# Patient Record
Sex: FEMALE | Race: BLACK OR AFRICAN AMERICAN | Employment: OTHER | URBAN - METROPOLITAN AREA
[De-identification: names, ages, dates, MRNs, and addresses within clinical notes are randomized per-mention and may not be internally consistent; named-entity substitution may affect disease eponyms.]

---

## 2018-06-21 ENCOUNTER — HOSPITAL ENCOUNTER (EMERGENCY)
Age: 48
Discharge: HOME | End: 2018-06-21
Attending: EMERGENCY MEDICINE
Payer: OTHER GOVERNMENT

## 2018-06-21 VITALS
RESPIRATION RATE: 18 BRPM | HEIGHT: 66 IN | BODY MASS INDEX: 26.84 KG/M2 | TEMPERATURE: 97.9 F | SYSTOLIC BLOOD PRESSURE: 128 MMHG | HEART RATE: 80 BPM | DIASTOLIC BLOOD PRESSURE: 87 MMHG | WEIGHT: 167 LBS | OXYGEN SATURATION: 100 %

## 2018-06-21 DIAGNOSIS — S39.012A STRAIN OF LUMBAR REGION, INITIAL ENCOUNTER: ICD-10-CM
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
## 2018-06-21 DIAGNOSIS — S16.1XXA STRAIN OF NECK MUSCLE, INITIAL ENCOUNTER: ICD-10-CM

## 2018-06-21 PROCEDURE — 99282 EMERGENCY DEPT VISIT SF MDM: CPT

## 2018-06-21 RX ORDER — CYCLOBENZAPRINE HCL 5 MG
5 TABLET ORAL
Qty: 10 TAB | Refills: 0 | Status: SHIPPED | OUTPATIENT
Start: 2018-06-21

## 2018-06-21 RX ORDER — IBUPROFEN 800 MG/1
800 TABLET ORAL
Qty: 20 TAB | Refills: 0 | Status: SHIPPED | OUTPATIENT
Start: 2018-06-21 | End: 2018-06-28

## 2018-06-21 NOTE — LETTER
[facility] [place] 
[facility] 
[address] [address] 
[phone] Work/School Note Date: 6/21/2018 To Whom It May concern: 
 
Wilmer Dominique was seen and treated today in the emergency room by the following provider(s): 
Attending Provider: Bess Quick MD 
Physician Assistant: SCOOTER Santamaria. Wilmer Dominique may return to work on 6/23/18. Sincerely, Janet Iyer PA-C
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
## 2019-05-03 ENCOUNTER — HOSPITAL ENCOUNTER (EMERGENCY)
Age: 49
Discharge: ANOTHER HOSPITAL | End: 2019-05-04
Attending: EMERGENCY MEDICINE
Payer: OTHER GOVERNMENT

## 2019-05-03 ENCOUNTER — APPOINTMENT (OUTPATIENT)
Dept: CT IMAGING | Age: 49
End: 2019-05-03
Attending: EMERGENCY MEDICINE
Payer: OTHER GOVERNMENT

## 2019-05-03 DIAGNOSIS — R06.02 SOB (SHORTNESS OF BREATH): Primary | ICD-10-CM

## 2019-05-03 DIAGNOSIS — R00.0 TACHYCARDIA: ICD-10-CM

## 2019-05-03 DIAGNOSIS — F43.0 STRESS REACTION: ICD-10-CM

## 2019-05-03 DIAGNOSIS — O09.93 HIGH-RISK PREGNANCY IN THIRD TRIMESTER: ICD-10-CM

## 2019-05-03 DIAGNOSIS — R77.8 ELEVATED TROPONIN: ICD-10-CM

## 2019-05-03 LAB
ALBUMIN SERPL-MCNC: 2.4 G/DL (ref 3.4–5)
ALBUMIN/GLOB SERPL: 0.8 {RATIO} (ref 0.8–1.7)
ALP SERPL-CCNC: 114 U/L (ref 45–117)
ALT SERPL-CCNC: 36 U/L (ref 13–56)
ANION GAP SERPL CALC-SCNC: 9 MMOL/L (ref 3–18)
AST SERPL-CCNC: 35 U/L (ref 15–37)
BASOPHILS # BLD: 0 K/UL (ref 0–0.1)
BASOPHILS NFR BLD: 0 % (ref 0–2)
BILIRUB SERPL-MCNC: 0.4 MG/DL (ref 0.2–1)
BUN SERPL-MCNC: 10 MG/DL (ref 7–18)
BUN/CREAT SERPL: 13 (ref 12–20)
CALCIUM SERPL-MCNC: 8.3 MG/DL (ref 8.5–10.1)
CHLORIDE SERPL-SCNC: 108 MMOL/L (ref 100–108)
CK MB CFR SERPL CALC: 1.7 % (ref 0–4)
CK MB CFR SERPL CALC: ABNORMAL % (ref 0–4)
CK MB SERPL-MCNC: 1 NG/ML (ref 5–25)
CK MB SERPL-MCNC: <1 NG/ML (ref 5–25)
CK SERPL-CCNC: 58 U/L (ref 26–192)
CK SERPL-CCNC: 77 U/L (ref 26–192)
CO2 SERPL-SCNC: 20 MMOL/L (ref 21–32)
CREAT SERPL-MCNC: 0.76 MG/DL (ref 0.6–1.3)
DIFFERENTIAL METHOD BLD: ABNORMAL
EOSINOPHIL # BLD: 0.1 K/UL (ref 0–0.4)
EOSINOPHIL NFR BLD: 1 % (ref 0–5)
ERYTHROCYTE [DISTWIDTH] IN BLOOD BY AUTOMATED COUNT: 13.6 % (ref 11.6–14.5)
GLOBULIN SER CALC-MCNC: 3.2 G/DL (ref 2–4)
GLUCOSE SERPL-MCNC: 132 MG/DL (ref 74–99)
HCT VFR BLD AUTO: 37 % (ref 35–45)
HGB BLD-MCNC: 12.2 G/DL (ref 12–16)
LACTATE BLD-SCNC: 1.92 MMOL/L (ref 0.4–2)
LYMPHOCYTES # BLD: 1.2 K/UL (ref 0.9–3.6)
LYMPHOCYTES NFR BLD: 15 % (ref 21–52)
MCH RBC QN AUTO: 27.5 PG (ref 24–34)
MCHC RBC AUTO-ENTMCNC: 33 G/DL (ref 31–37)
MCV RBC AUTO: 83.5 FL (ref 74–97)
MONOCYTES # BLD: 0.6 K/UL (ref 0.05–1.2)
MONOCYTES NFR BLD: 7 % (ref 3–10)
NEUTS SEG # BLD: 6.1 K/UL (ref 1.8–8)
NEUTS SEG NFR BLD: 77 % (ref 40–73)
PLATELET # BLD AUTO: 137 K/UL (ref 135–420)
PMV BLD AUTO: 12.6 FL (ref 9.2–11.8)
POTASSIUM SERPL-SCNC: 3.9 MMOL/L (ref 3.5–5.5)
PROT SERPL-MCNC: 5.6 G/DL (ref 6.4–8.2)
RBC # BLD AUTO: 4.43 M/UL (ref 4.2–5.3)
SODIUM SERPL-SCNC: 137 MMOL/L (ref 136–145)
TROPONIN I SERPL-MCNC: 0.06 NG/ML (ref 0–0.04)
TROPONIN I SERPL-MCNC: 0.08 NG/ML (ref 0–0.04)
TROPONIN I SERPL-MCNC: 0.23 NG/ML (ref 0–0.04)
WBC # BLD AUTO: 8 K/UL (ref 4.6–13.2)

## 2019-05-03 PROCEDURE — 93005 ELECTROCARDIOGRAM TRACING: CPT

## 2019-05-03 PROCEDURE — 85025 COMPLETE CBC W/AUTO DIFF WBC: CPT

## 2019-05-03 PROCEDURE — 83605 ASSAY OF LACTIC ACID: CPT

## 2019-05-03 PROCEDURE — 74011250636 HC RX REV CODE- 250/636: Performed by: EMERGENCY MEDICINE

## 2019-05-03 PROCEDURE — 96360 HYDRATION IV INFUSION INIT: CPT

## 2019-05-03 PROCEDURE — 71275 CT ANGIOGRAPHY CHEST: CPT

## 2019-05-03 PROCEDURE — 82550 ASSAY OF CK (CPK): CPT

## 2019-05-03 PROCEDURE — 99285 EMERGENCY DEPT VISIT HI MDM: CPT

## 2019-05-03 PROCEDURE — 80053 COMPREHEN METABOLIC PANEL: CPT

## 2019-05-03 PROCEDURE — 74011636320 HC RX REV CODE- 636/320: Performed by: EMERGENCY MEDICINE

## 2019-05-03 RX ADMIN — IOPAMIDOL 100 ML: 755 INJECTION, SOLUTION INTRAVENOUS at 12:39

## 2019-05-03 RX ADMIN — SODIUM CHLORIDE 1000 ML: 900 INJECTION, SOLUTION INTRAVENOUS at 10:18

## 2019-05-03 NOTE — ED PROVIDER NOTES
EMERGENCY DEPARTMENT HISTORY AND PHYSICAL EXAM 
 
Date: 5/3/2019 Patient Name: Tish Carrera History of Presenting Illness Chief Complaint Patient presents with  Rapid Heart Rate History Provided By: Patient Chief Complaint: Palpitations Duration:  Minutes prior to arrival to ED Additional History (Context):  
 
Tish Carrera is a 51 y.o. female with PMHX of no medical problems per patient's report who presents to the emergency department C/O palpitations while she was driving. . Associated sxs include anxiety, shortness of breath, chest tightness. Pt denies chest pain, leg swelling or leg pain, abdominal pain, nausea, dizziness, headache, numbness, and any other sxs or complaints. She also denies any history of anxiety. Does states that she is under some stress. Patient states that she is 28 weeks pregnant with triplets and that she sees maternal fetal medicine at [facility].  Denies any recent or history of blood clots. States that the symptoms happened suddenly but now they are resolving. Is I am in the room evaluating her she states that the breathing is improved, and the palpitations are resolving. No other complaints. PCP: Mynor, MD [place] 
 
Current Outpatient Medications Medication Sig Dispense Refill  PNV No12-Iron-[id number]-DSS-OM-3 29 mg iron-1 mg -50 mg CPKD Take  by mouth.  cyclobenzaprine (FLEXERIL) 5 mg tablet Take 1 Tab by mouth three (3) times daily as needed for Muscle Spasm(s). 10 Tab 0 Past History Past Medical History: 
History reviewed. No pertinent past medical history. Past Surgical History: 
History reviewed. No pertinent surgical history. Family History: 
History reviewed. No pertinent family history. Social History: 
Social History Tobacco Use  Smoking status: Never Smoker  Smokeless tobacco: Never Used Substance Use Topics  Alcohol use: Not on file  Drug use: Not on file Allergies: No Known Allergies Review of Systems Review of Systems Constitutional: Negative for chills and fever. HENT: Negative for congestion, rhinorrhea, sore throat and trouble swallowing. Eyes: Negative for visual disturbance. Respiratory: Positive for chest tightness and shortness of breath. Negative for cough and wheezing. Cardiovascular: Positive for palpitations. Negative for chest pain. Gastrointestinal: Negative for abdominal pain, nausea and vomiting. Endocrine: Negative for polyuria. Genitourinary: Negative for dysuria. Musculoskeletal: Negative for arthralgias and neck stiffness. Skin: Negative for pallor and rash. Neurological: Negative for dizziness, weakness, numbness and headaches. Hematological: Does not bruise/bleed easily. Psychiatric/Behavioral: Negative for confusion and dysphoric mood. The patient is nervous/anxious. All other systems reviewed and are negative. Physical Exam  
 
Vitals:  
 05/03/19 1919 05/03/19 1920 05/03/19 1921 05/03/19 1922 BP:      
Pulse: 92 91 92 96 Resp: 19 18 21 20 Temp:      
SpO2:      
Weight:      
Height:      
 
Physical Exam  
Constitutional: She is oriented to person, place, and time. She appears well-developed and well-nourished. No distress. Hyperventilating HENT:  
Head: Normocephalic and atraumatic. Mouth/Throat: Oropharynx is clear and moist.  
Eyes: Pupils are equal, round, and reactive to light. Conjunctivae are normal. No scleral icterus. Neck: Normal range of motion. Neck supple. Cardiovascular: Normal rate and intact distal pulses. Exam reveals no gallop and no friction rub. No murmur heard. Capillary refill < 3 seconds Tachycardic Pulmonary/Chest: Effort normal and breath sounds normal. No respiratory distress. She has no wheezes. Abdominal: Soft. Bowel sounds are normal. She exhibits no distension. There is no tenderness. Gravid abdomen Musculoskeletal: Normal range of motion. She exhibits no edema. No lower extremity edema No calf tenderness Lymphadenopathy:  
  She has no cervical adenopathy. Neurological: She is alert and oriented to person, place, and time. No cranial nerve deficit. She exhibits normal muscle tone. Coordination normal.  
Skin: Skin is warm and dry. No rash noted. She is not diaphoretic. Psychiatric: Judgment and thought content normal.  
Anxious appearing Nursing note and vitals reviewed. Diagnostic Study Results Labs - Recent Results (from the past 12 hour(s)) EKG, 12 LEAD, INITIAL Collection Time: 05/03/19 10:03 AM  
Result Value Ref Range Ventricular Rate 133 BPM  
 Atrial Rate 133 BPM  
 P-R Interval 116 ms  
 QRS Duration 76 ms  
 Q-T Interval 292 ms QTC Calculation (Bezet) 434 ms Calculated P Axis 69 degrees Calculated R Axis 36 degrees Calculated T Axis 56 degrees Diagnosis Sinus tachycardia Possible Left atrial enlargement Borderline ECG No previous ECGs available CBC WITH AUTOMATED DIFF Collection Time: 05/03/19 10:13 AM  
Result Value Ref Range WBC 8.0 4.6 - 13.2 K/uL  
 RBC 4.43 4.20 - 5.30 M/uL  
 HGB 12.2 12.0 - 16.0 g/dL HCT 37.0 35.0 - 45.0 % MCV 83.5 74.0 - 97.0 FL  
 MCH 27.5 24.0 - 34.0 PG  
 MCHC 33.0 31.0 - 37.0 g/dL  
 RDW 13.6 11.6 - 14.5 % PLATELET 037 968 - 218 K/uL MPV 12.6 (H) 9.2 - 11.8 FL  
 NEUTROPHILS 77 (H) 40 - 73 % LYMPHOCYTES 15 (L) 21 - 52 % MONOCYTES 7 3 - 10 % EOSINOPHILS 1 0 - 5 % BASOPHILS 0 0 - 2 %  
 ABS. NEUTROPHILS 6.1 1.8 - 8.0 K/UL  
 ABS. LYMPHOCYTES 1.2 0.9 - 3.6 K/UL  
 ABS. MONOCYTES 0.6 0.05 - 1.2 K/UL  
 ABS. EOSINOPHILS 0.1 0.0 - 0.4 K/UL  
 ABS. BASOPHILS 0.0 0.0 - 0.1 K/UL  
 DF AUTOMATED METABOLIC PANEL, COMPREHENSIVE Collection Time: 05/03/19 10:13 AM  
Result Value Ref Range Sodium 137 136 - 145 mmol/L Potassium 3.9 3.5 - 5.5 mmol/L  Chloride 108 100 - 108 mmol/L  
 CO2 20 (L) 21 - 32 mmol/L Anion gap 9 3.0 - 18 mmol/L Glucose 132 (H) 74 - 99 mg/dL BUN 10 7.0 - 18 MG/DL Creatinine 0.76 0.6 - 1.3 MG/DL  
 BUN/Creatinine ratio 13 12 - 20 GFR est AA >60 >60 ml/min/1.73m2 GFR est non-AA >60 >60 ml/min/1.73m2 Calcium 8.3 (L) 8.5 - 10.1 MG/DL Bilirubin, total 0.4 0.2 - 1.0 MG/DL  
 ALT (SGPT) 36 13 - 56 U/L  
 AST (SGOT) 35 15 - 37 U/L Alk. phosphatase 114 45 - 117 U/L Protein, total 5.6 (L) 6.4 - 8.2 g/dL Albumin 2.4 (L) 3.4 - 5.0 g/dL Globulin 3.2 2.0 - 4.0 g/dL A-G Ratio 0.8 0.8 - 1.7 CARDIAC PANEL,(CK, CKMB & TROPONIN) Collection Time: 05/03/19 10:13 AM  
Result Value Ref Range CK 77 26 - 192 U/L  
 CK - MB <1.0 <3.6 ng/ml CK-MB Index  0.0 - 4.0 % CALCULATION NOT PERFORMED WHEN RESULT IS BELOW LINEAR LIMIT Troponin-I, QT 0.06 (H) 0.0 - 0.045 NG/ML  
POC LACTIC ACID Collection Time: 05/03/19 10:19 AM  
Result Value Ref Range Lactic Acid (POC) 1.92 0.40 - 2.00 mmol/L  
TROPONIN I Collection Time: 05/03/19  1:15 PM  
Result Value Ref Range Troponin-I, QT 0.23 (H) 0.0 - 0.045 NG/ML  
EKG, 12 LEAD, SUBSEQUENT Collection Time: 05/03/19  2:35 PM  
Result Value Ref Range Ventricular Rate 90 BPM  
 Atrial Rate 90 BPM  
 P-R Interval 132 ms QRS Duration 80 ms  
 Q-T Interval 338 ms QTC Calculation (Bezet) 413 ms Calculated P Axis 67 degrees Calculated R Axis 48 degrees Calculated T Axis 51 degrees Diagnosis Normal sinus rhythm Normal ECG When compared with ECG of 03-MAY-2019 10:03, No significant change was found Radiologic Studies -  
CTA CHEST W OR W WO CONT Final Result IMPRESSION:  
  
1. Technically limited examination without evidence of large or proximal  
pulmonary embolism. The pulmonary arterial tree beyond the lobar level is  
inadequately opacified to allow for the exclusion of filling defects. 2. Bibasilar atelectasis without evidence of alveolar consolidation or findings  
of pulmonary edema. 3. Normal cardiac size without pericardial effusion. 4. Small hiatal hernia with mild thickening of the distal thoracic esophagus,  
potentially reflecting sequela of gastroesophageal reflux disease. 5. Small hyperattenuating focus in the liver, very likely a flash filling  
hemangioma CT Results  (Last 48 hours) 05/03/19 1253  CTA 1144 [facility] Final result Impression:  IMPRESSION:  
   
1. Technically limited examination without evidence of large or proximal  
pulmonary embolism. The pulmonary arterial tree beyond the lobar level is  
inadequately opacified to allow for the exclusion of filling defects. 2. Bibasilar atelectasis without evidence of alveolar consolidation or findings  
of pulmonary edema. 3. Normal cardiac size without pericardial effusion. 4. Small hiatal hernia with mild thickening of the distal thoracic esophagus,  
potentially reflecting sequela of gastroesophageal reflux disease. 5. Small hyperattenuating focus in the liver, very likely a flash filling  
hemangioma Narrative:  EXAM: CTA Chest  
   
INDICATION: Pregnant patient with shortness of breath, evaluation for potential  
pulmonary embolism. COMPARISON: No prior study. TECHNIQUE: Axial CT imaging from the thoracic inlet through the diaphragm with  
intravenous contrast utilizing CTA study for pulmonary artery evaluation. Coronal and sagittal MIP reformations were generated at a separate workstation. One or more dose reduction techniques were used on this CT: automated exposure  
control, adjustment of the mAs and/or kVp according to patient size, and  
iterative reconstruction techniques. The specific techniques used on this CT  
exam have been documented in the patient's electronic medical record.   Digital  
 Imaging and Communications in Medicine (DICOM) format image data are available  
to nonaffiliated external healthcare facilities or entities on a secure, media  
free, reciprocally searchable basis with patient authorization for at least a  
12-month period after this study. In addition, the patient's gravid abdomen was  
circumferentially shielded for the purposes of the scan.  
   
_______________ FINDINGS:  
   
EXAM QUALITY: Overall exam quality is adequate for the exclusion of large,  
central pulmonary emboli. The pulmonary arterial tree beyond the lobar level is  
inadequately opacified to allow for exclusion of filling defects. PULMONARY ARTERIES: As above, no large or central pulmonary embolism. Pulmonary  
arterial tree to the lobar level demonstrates no filling defects. Distal to the  
lobar level, the pulmonary arterial branches are inadequately opacified. Normal  
main pulmonary arterial size. MEDIASTINUM: Included thyroid gland demonstrates no focal abnormality. Normal  
thoracic aortic course and caliber. Normal cardiac size. No pericardial  
effusion. LYMPH NODES: No enlarged mediastinal or hilar nodes by size criteria. AIRWAY: Central airways are patent. LUNGS: Atelectasis is present at the lung bases bilaterally. No pneumonic  
consolidation or significant septal line thickening. No abnormal groundglass  
density. PLEURA: No pleural effusion or pneumothorax. UPPER ABDOMEN: Included portions of the upper abdomen demonstrate a small hiatal  
hernia with mild thickening of the distal thoracic esophagus within the stomach. Small hyperattenuating focus within the right hepatic lobe near the dome (image 102) measures approximately a centimeter in greatest diameter. Limited inclusion  
of the gravid uterus, unavoidable secondary to uterine enlargement with triplet  
gestation demonstrates no acute abnormality. Anguiano Junk OTHER: No acute or aggressive osseous abnormalities identified. _______________ CXR Results  (Last 48 hours) None Medications given in the ED- Medications  
sodium chloride 0.9 % bolus infusion 1,000 mL (0 mL IntraVENous IV Completed 5/3/19 1132) iopamidol (ISOVUE-370) 76 % injection 100 mL (100 mL IntraVENous Given 5/3/19 1239) Medical Decision Making I am the first provider for this patient. I reviewed the vital signs, available nursing notes, past medical history, past surgical history, family history and social history. Vital Signs-Reviewed the patient's vital signs. Pulse Oximetry Analysis -100 % on Qi 
 
Cardiac Monitor: 
Rate: 33 bpm 
Rhythm: Sinus tachycardia EKG interpretation: (Preliminary) 10:00 AM  
 
EKG read by Dr. Nahid Louie at 10:03 AM 
Interpretation: Sinus tachycardia, rate 133, normal QRS duration, normal QTC, normal axis, no ST elevation, no ST depressions Records Reviewed: Nursing Notes and Old Medical Records Provider Notes (Medical Decision Making): DDX: Anxiety attack, stress reaction, metabolic, arrhythmia, PE On arrival as I told patient to to relax and take slow deep breaths, her symptoms began to resolve. Possibly stress reaction/anxiety reaction Check labs, EKG, place her on a monitor Procedures: 
Procedures ED Course:  
Initial assessment performed. The patients presenting problems have been discussed, and they are in agreement with the care plan formulated and outlined with them. I have encouraged them to ask questions as they arise throughout their visit. 10:55 AM 
I reassessed patient, she was resting comfortably states feeling much better no shortness of breath or chest tightness. Heart rate 105 on a monitor oxygen 97% room air 11:32 AM 
Patient remains stable, states she continues to feel much better. Heart rate 100 on the monitor and resting comfortably.   She again denies any abdominal pain. In light of this acute stress-like reaction tachycardia, and pregnancy with triplets, I will contact her maternal-fetal medicine OB/GYN specialist and discuss. Consult:  Discussed care with Dr. Gabe Billingsley, Specialty: Maternal-fetal specialist at [facility], standard discussion; including history of patients chief complaint, available diagnostic results, and treatment course. I discussed will be repeating her troponin, and considering CTA chest for PE rule out if worsening vitals. She agrees with this plan and request callback for update 12:02 PM, 5/3/2019 I reassessed patient, she is resting heart rate has increased to 110 on the monitor. Patient reports that she has no chest tightness now but this shortness of breath has been intermittent a few times in here. Patient is at risk for PE, will get CTA chest to rule out PE No PE Repeat troponin has increased now to 0.23 from 0.06 I will consult on-call cardiology here, and then call [facility] for maternal-fetal medicine, OB/GYN Consult:  Discussed care with Dr. Eugene Marie, Specialty: Cardiologist, standard discussion; including history of patients chief complaint, available diagnostic results, and treatment course. He does not recommend any anticoagulation. States patient should be transferred to [facility] for a high risk pregnancy where they should get a cardiology consult. He states this could be elevated troponin from tachycardia but needs to rule out underlying heart condition, such as cardiomyopathy or CAD 
2:22 PM, 5/3/2019  
 
2:32 PM 
I spoke with Dr. Kayley Neely at [facility].  I discussed CTA results, and elevated troponin along with recommendations by our cardiologist here. He states he will call me back with the plan as to whether they can accept patient or not.  
 
2:40 PM 
Dr. Manual Pinon call back and states that I will need to talk with the OB/GYN doctor who would be excepting transfer and discuss case further with him. 2:50 PM 
Spoke with Dr. Joshua Gupta Dr.  He states that he would be the accepting doctor and wants to hear more outpatient. I discussed diagnosis, results, imaging, consults and overall case. He states that they may not have the capacity to accept patient. He said he will call me back with the plan. 3:12 PM 
Dr. Lorna Lroa call back states that they cannot accept patient due to volume there and no bed availability for patient there. Recommends transferring patient to a high level of care who can handle maternal-fetal medicine. Consult:  Discussed care with , Specialty: Maternal-fetal medicine OB/GYN specialist, standard discussion; including history of patients chief complaint, available diagnostic results, and treatment course. I discussed with her the need for cardiology consult and recommendations by my cardiologist.   She accepts admission for transfer to L&D floor at [facility].  Transfer center states no bed there currently but they will call back when is available. States could be 2 or 3 hours. 4:13 PM, 5/3/2019 I reassessed patient, she is in no distress gave her update on plan to transfer to [facility] instead of [facility] due to no available appropriate bed for her and their high capacity. She agrees with this plan. Patient food to eat. Will sign out this patient to oncoming doctor at shift change. Although I am signing out this patient to Dr. Catie Tran, and appreciate his care, I will remain the provider of record Mary Howard DO Pending: Repeat troponin,  transfer to [address] [place] [address] care time:  
I have spent 55 minutes of critical care time involved in lab review, consultations with specialist, family decision making, and documentation. During this entire length of time I was immediately available to the patient. Critical care: The reason for providing this level of medical care for this critically ill patient was due to a critical illness that impaired one or more vital organ systems such that there was a high probability of imminent or life threatening deterioration in the patients condition. This care involved high complexity decision making to assess, manipulate and support vital system functions, to treat this degree vital organ system failure and to prevent further life threatening deterioration of the patient's condition. Diagnosis and Disposition CLINICAL IMPRESSION: 
 
1. SOB (shortness of breath) 2. Tachycardia 3. Elevated troponin 4. Stress reaction 5. High-risk pregnancy in third trimester PLAN: 
1. Transfer to [facility] L&D, maternal fetal 
 
 
Tahmina Loud, DO 
 
Dragon medical dictation software was used for portions of this report. Unintended transcription errors may occur. My signature above authenticates this document and my orders, the final   
diagnosis (es), discharge prescription (s), and instructions in the Epic   
record. 7:10 AM 
5/5/2019 Patient still in the ED my shift began this morning. ED attending here Dr. Faustino Bowen states no beds were available and [facility]. 
 
Patient in no distress, appears to be resting comfortably, no new complaint. Transfer center call multiple times and states that they are still waiting for bed. I discussed case again with Dr. Scarlet Garcia, cardiologist.  He states no further work-up from here. Still recommending patient to be transferred. I discussed with him that her troponin was trending down. Patient Vitals for the past 12 hrs: 
 Pulse Resp BP SpO2  
05/04/19 1146 94 20    
05/04/19 1140 (!) 104 25 99/61   
05/04/19 1000 (!) 102 21 110/63 100 % 05/04/19 0930 (!) 112 21 103/68 100 % 05/04/19 0900 99 18 105/56   
05/04/19 0830 93 20 104/66 100 % 05/04/19 0800 88 20 113/64 100 % 05/04/19 0730 84 20 114/69 100 % 05/04/19 0700 97 10 108/67 100 % 05/04/19 0630 83 19 99/57   
05/04/19 0600 83 18 112/72   
05/04/19 0500 98 18 91/66   
05/04/19 0428 84 19 105/68   
05/04/19 0400 88 18 (!) 89/49   
05/04/19 0342 87 21 103/58   
05/04/19 0300 86 21 102/59   
05/04/19 0130 85 18 104/69   
05/04/19 0030 95 20 109/68   
 
12:06 PM 
I will contact transfer center in an attempt to do ED to ED transfer. Consult:  Discussed care with Dr. Abdoulaye Toledo, Specialty: ED attending at [facility] standard discussion; including history of patients chief complaint, available diagnostic results, and treatment course. Accepts ED to ED transfer 12:20 PM, 5/3/2019 DO Monserrat Sands medical dictation software was used for portions of this report. Unintended transcription errors may occur. My signature above authenticates this document and my orders, the final   
diagnosis (es), discharge prescription (s), and instructions in the Epic   
record.

## 2019-05-03 NOTE — DISCHARGE INSTRUCTIONS
Patient Education        Learning About Stress  What is stress? Stress is what you feel when you have to handle more than you are used to. Stress is a fact of life for most people, and it affects everyone differently. What causes stress for you may not be stressful for someone else. A lot of things can cause stress. You may feel stress when you go on a job interview, take a test, or run a race. This kind of short-term stress is normal and even useful. It can help you if you need to work hard or react quickly. For example, stress can help you finish an important job on time. Stress also can last a long time. Long-term stress is caused by stressful situations or events. Examples of long-term stress include long-term health problems, ongoing problems at work, or conflicts in your family. Long-term stress can harm your health. How does stress affect your health? When you are stressed, your body responds as though you are in danger. It makes hormones that speed up your heart, make you breathe faster, and give you a burst of energy. This is called the fight-or-flight stress response. If the stress is over quickly, your body goes back to normal and no harm is done. But if stress happens too often or lasts too long, it can have bad effects. Long-term stress can make you more likely to get sick, and it can make symptoms of some diseases worse. If you tense up when you are stressed, you may develop neck, shoulder, or low back pain. Stress is linked to high blood pressure and heart disease. Stress also harms your emotional health. It can make you denny, tense, or depressed. Your relationships may suffer, and you may not do well at work or school. What can you do to manage stress? How to relax your mind  · Write. It may help to write about things that are bothering you. This helps you find out how much stress you feel and what is causing it. When you know this, you can find better ways to cope.   · Let your feelings out. Talk, laugh, cry, and express anger when you need to. Talking with friends, family, a counselor, or a member of the clergy about your feelings is a healthy way to relieve stress. · Do something you enjoy. For example, listen to music or go to a movie. Practice your hobby or do volunteer work. · Meditate. This can help you relax, because you are not worrying about what happened before or what may happen in the future. · Do guided imagery. Imagine yourself in any setting that helps you feel calm. You can use audiotapes, books, or a teacher to guide you. How to relax your body  · Do something active. Exercise or activity can help reduce stress. Walking is a great way to get started. Even everyday activities such as housecleaning or yard work can help. · Do breathing exercises. For example:  ? From a standing position, bend forward from the waist with your knees slightly bent. Let your arms dangle close to the floor. ? Breathe in slowly and deeply as you return to a standing position. Roll up slowly and lift your head last.  ? Hold your breath for just a few seconds in the standing position. ? Breathe out slowly and bend forward from the waist.  · Try yoga or rayo chi. These techniques combine exercise and meditation. You may need some training at first to learn them. What can you do to prevent stress? · Manage your time. This helps you find time to do the things you want and need to do. · Get enough sleep. Your body recovers from the stresses of the day while you are sleeping. · Get support. Your family, friends, and community can make a difference in how you experience stress. Where can you learn more? Go to http://lito-karina.info/. Enter P320 in the search box to learn more about \"Learning About Stress. \"  Current as of: June 28, 2018  Content Version: 11.9  © 6324-1715 [employer].  Care instructions adapted under license by [employer] (which disclaims liability or warranty for this information). If you have questions about a medical condition or this instruction, always ask your healthcare professional. [address] any warranty or liability for your use of this information. Patient Education        Work-Life Balance: Care Instructions  Your Care Instructions    Do you ever feel like there is not enough time to do all of the things you have to do, and no time at all for the things you enjoy? If so, you are not alone. On average, people in the [place] have worked more and more hours each year since 1970. But in recent years, fewer people say they want to take on more at work, even if they would get promoted or get paid more money. More and more workers say they want time to spend with their families and to do things that are important to them. Do you ever feel:  · That you always have more and more work to do at your job? · That too many people depend on you every day? · That you never have enough time for your family or friends? · That you never have time for hobbies or things you enjoy? · That each second of your day is scheduled? If you answered \"yes\" to any of these questions, take steps at work and at home to get your life into balance. Follow-up care is a key part of your treatment and safety. Be sure to make and go to all appointments, and call your doctor if you are having problems. How can you care for yourself at home? Manage your time  · Focus on the important things. Taking on too much can wear you out. Look at how you spend your time, and redirect your focus. Learn to say \"no\" and let go of things that do not matter. · Set one small goal at a time. Use a day planner. Break large projects into smaller ones. · Ask for help. Let your children, your spouse, your coworkers, and other people in your life help you get things done. · Leave your job at the office.  If you give up free time to get more work done, you may pay for it with stress. If your job offers a flexible work schedule, use it to fit your own work style. For instance, come in earlier to have a longer lunch break, or make time for a yoga class or workout during your workday. · Unplug. Do not let technology (such as your cell phone or the Internet) erase the line between your time and your employer's time. Lower job stress  Job stress causes trouble at work and at home. At work, you may worry about things you have not had time to do at home. At home, you may worry about your job. This cycle upsets your work-life balance. Lowering your job stress can get your life back in balance. Job stress can be caused by:  · Pressure and deadlines. · Heavy workloads or long hours. · Not being allowed to make decisions. · Health and safety hazards. · Feeling you may lose your job. · Unclear or changing job duties. · Too much responsibility. · Work that is very tiring or boring. Do any of these things bother you? Consider talking with your boss to change things. There are some things that you may not be able to control. But even a few small changes might help lower your stress. Take advantage of programs at work  Businesses make money and are better off in other ways if their employees are healthy and happy. For this reason, many companies have programs to help balance work life and home life. These programs may include:  · Flexible schedules and hours. · Time off for family reasons, education, or community service. · Being able to work from home. · Employee assistance programs to provide counseling. · Child-care programs. Check to see if your company has any of these or other programs that could help you. If not, consider talking to your boss about why work-life balance programs make good business sense. Even if your company does not start an official program, you may be able to get flexible hours, time off, or the ability to do some work from home.   Know when to quit  If you are truly unhappy because of a stressful job, and if the suggestions here have not worked, it may be time to think about changing jobs or changing careers. But before you quit, take time to research your options. Where can you learn more? Go to http://lito-karina.info/. Enter H402 in the search box to learn more about \"Work-Life Balance: Care Instructions. \"  Current as of: June 28, 2018  Content Version: 11.9  © 7778-9227 [employer]. Care instructions adapted under license by [employer] (which disclaims liability or warranty for this information). If you have questions about a medical condition or this instruction, always ask your healthcare professional. [address] any warranty or liability for your use of this information.

## 2019-05-04 VITALS
BODY MASS INDEX: 32.65 KG/M2 | DIASTOLIC BLOOD PRESSURE: 61 MMHG | WEIGHT: 208 LBS | HEART RATE: 85 BPM | SYSTOLIC BLOOD PRESSURE: 113 MMHG | HEIGHT: 67 IN | RESPIRATION RATE: 18 BRPM | OXYGEN SATURATION: 100 % | TEMPERATURE: 98.6 F

## 2019-05-04 NOTE — ED NOTES
Telephone report called to Gerardo Rolle at [facility] ED, all questions answered at this time

## 2019-05-04 NOTE — ED NOTES
Patient transported via [address] with all belongings to be transported to [facility] [place] ED

## 2019-05-04 NOTE — ED NOTES
12:05 AM Extensive conversation continued observation versus discharged with outpatient follow up., initially her  (primarily)  and she were very interested in going home and returning either for f/u or as needed, then agreed to stay as we further discussed potential risks of cardiomyopathy, myocarditis, pericarditis, NSTEMI. This note is prepared by [facility], acting as Scribe for Yessenia. Alfredo Ferreira MD. Yessenia. Alfredo Ferreira MD:  The scribe's documentation has been prepared under my direction and personally reviewed by me in its entirety. I confirm that the note above accurately reflects all work, treatment, procedures, and medical decision making performed by me.

## 2019-05-04 NOTE — ED NOTES
Assumed care of patient at this time, patient a/ox3, patient denies pain, patient updated on plan of care, patient able to ambulate and move all extremities,

## 2019-05-04 NOTE — ED NOTES
Spoke with L&D in regards to concern for no fetal heart monitoring of triplets at this time. Staff reports inability to monitor triplets in unit and that recommendation is for Ultrasound to obtain fetal heart rates.

## 2019-05-11 LAB
ATRIAL RATE: 133 BPM
ATRIAL RATE: 90 BPM
CALCULATED P AXIS, ECG09: 67 DEGREES
CALCULATED P AXIS, ECG09: 69 DEGREES
CALCULATED R AXIS, ECG10: 36 DEGREES
CALCULATED R AXIS, ECG10: 48 DEGREES
CALCULATED T AXIS, ECG11: 51 DEGREES
CALCULATED T AXIS, ECG11: 56 DEGREES
DIAGNOSIS, 93000: NORMAL
DIAGNOSIS, 93000: NORMAL
P-R INTERVAL, ECG05: 116 MS
P-R INTERVAL, ECG05: 132 MS
Q-T INTERVAL, ECG07: 292 MS
Q-T INTERVAL, ECG07: 338 MS
QRS DURATION, ECG06: 76 MS
QRS DURATION, ECG06: 80 MS
QTC CALCULATION (BEZET), ECG08: 413 MS
QTC CALCULATION (BEZET), ECG08: 434 MS
VENTRICULAR RATE, ECG03: 133 BPM
VENTRICULAR RATE, ECG03: 90 BPM

## 2022-02-25 PROCEDURE — 82962 GLUCOSE BLOOD TEST: CPT

## 2023-11-01 ENCOUNTER — HOSPITAL ENCOUNTER (EMERGENCY)
Facility: HOSPITAL | Age: 53
Discharge: HOME | End: 2023-11-01
Attending: EMERGENCY MEDICINE | Admitting: EMERGENCY MEDICINE
Payer: OTHER GOVERNMENT

## 2023-11-01 VITALS
RESPIRATION RATE: 18 BRPM | SYSTOLIC BLOOD PRESSURE: 139 MMHG | OXYGEN SATURATION: 99 % | WEIGHT: 187.83 LBS | DIASTOLIC BLOOD PRESSURE: 75 MMHG | BODY MASS INDEX: 27.82 KG/M2 | TEMPERATURE: 98.7 F | HEART RATE: 61 BPM | HEIGHT: 69 IN

## 2023-11-01 DIAGNOSIS — H81.10 BENIGN PAROXYSMAL POSITIONAL VERTIGO, UNSPECIFIED LATERALITY: Primary | ICD-10-CM

## 2023-11-01 LAB
ALBUMIN SERPL-MCNC: 4.4 G/DL (ref 3.5–5.2)
ALBUMIN/GLOB SERPL: 1.4 G/DL
ALP SERPL-CCNC: 68 U/L (ref 39–117)
ALT SERPL W P-5'-P-CCNC: 15 U/L (ref 1–33)
ANION GAP SERPL CALCULATED.3IONS-SCNC: 10.4 MMOL/L (ref 5–15)
AST SERPL-CCNC: 19 U/L (ref 1–32)
BACTERIA UR QL AUTO: NORMAL /HPF
BASOPHILS # BLD AUTO: 0.08 10*3/MM3 (ref 0–0.2)
BASOPHILS NFR BLD AUTO: 1.6 % (ref 0–1.5)
BILIRUB SERPL-MCNC: 0.5 MG/DL (ref 0–1.2)
BILIRUB UR QL STRIP: NEGATIVE
BUN SERPL-MCNC: 23 MG/DL (ref 6–20)
BUN/CREAT SERPL: 20.2 (ref 7–25)
CALCIUM SPEC-SCNC: 9.5 MG/DL (ref 8.6–10.5)
CHLORIDE SERPL-SCNC: 104 MMOL/L (ref 98–107)
CLARITY UR: CLEAR
CO2 SERPL-SCNC: 26.6 MMOL/L (ref 22–29)
COLOR UR: YELLOW
CREAT SERPL-MCNC: 1.14 MG/DL (ref 0.57–1)
DEPRECATED RDW RBC AUTO: 37.8 FL (ref 37–54)
EGFRCR SERPLBLD CKD-EPI 2021: 58 ML/MIN/1.73
EOSINOPHIL # BLD AUTO: 0.06 10*3/MM3 (ref 0–0.4)
EOSINOPHIL NFR BLD AUTO: 1.2 % (ref 0.3–6.2)
ERYTHROCYTE [DISTWIDTH] IN BLOOD BY AUTOMATED COUNT: 13.2 % (ref 12.3–15.4)
GLOBULIN UR ELPH-MCNC: 3.1 GM/DL
GLUCOSE SERPL-MCNC: 96 MG/DL (ref 65–99)
GLUCOSE UR STRIP-MCNC: NEGATIVE MG/DL
HCT VFR BLD AUTO: 40 % (ref 34–46.6)
HGB BLD-MCNC: 12.7 G/DL (ref 12–15.9)
HGB UR QL STRIP.AUTO: NEGATIVE
HOLD SPECIMEN: NORMAL
HOLD SPECIMEN: NORMAL
HYALINE CASTS UR QL AUTO: NORMAL /LPF
IMM GRANULOCYTES # BLD AUTO: 0.01 10*3/MM3 (ref 0–0.05)
IMM GRANULOCYTES NFR BLD AUTO: 0.2 % (ref 0–0.5)
KETONES UR QL STRIP: NEGATIVE
LEUKOCYTE ESTERASE UR QL STRIP.AUTO: ABNORMAL
LYMPHOCYTES # BLD AUTO: 2.23 10*3/MM3 (ref 0.7–3.1)
LYMPHOCYTES NFR BLD AUTO: 45.7 % (ref 19.6–45.3)
MCH RBC QN AUTO: 25.2 PG (ref 26.6–33)
MCHC RBC AUTO-ENTMCNC: 31.8 G/DL (ref 31.5–35.7)
MCV RBC AUTO: 79.5 FL (ref 79–97)
MONOCYTES # BLD AUTO: 0.31 10*3/MM3 (ref 0.1–0.9)
MONOCYTES NFR BLD AUTO: 6.4 % (ref 5–12)
NEUTROPHILS NFR BLD AUTO: 2.19 10*3/MM3 (ref 1.7–7)
NEUTROPHILS NFR BLD AUTO: 44.9 % (ref 42.7–76)
NITRITE UR QL STRIP: NEGATIVE
NRBC BLD AUTO-RTO: 0 /100 WBC (ref 0–0.2)
PH UR STRIP.AUTO: 5.5 [PH] (ref 5–8)
PLATELET # BLD AUTO: 208 10*3/MM3 (ref 140–450)
PMV BLD AUTO: 12.7 FL (ref 6–12)
POTASSIUM SERPL-SCNC: 4 MMOL/L (ref 3.5–5.2)
PROT SERPL-MCNC: 7.5 G/DL (ref 6–8.5)
PROT UR QL STRIP: NEGATIVE
RBC # BLD AUTO: 5.03 10*6/MM3 (ref 3.77–5.28)
RBC # UR STRIP: NORMAL /HPF
REF LAB TEST METHOD: NORMAL
SODIUM SERPL-SCNC: 141 MMOL/L (ref 136–145)
SP GR UR STRIP: 1.01 (ref 1–1.03)
SQUAMOUS #/AREA URNS HPF: NORMAL /HPF
UROBILINOGEN UR QL STRIP: ABNORMAL
WBC # UR STRIP: NORMAL /HPF
WBC NRBC COR # BLD: 4.88 10*3/MM3 (ref 3.4–10.8)
WHOLE BLOOD HOLD COAG: NORMAL
WHOLE BLOOD HOLD SPECIMEN: NORMAL

## 2023-11-01 PROCEDURE — 85025 COMPLETE CBC W/AUTO DIFF WBC: CPT | Performed by: EMERGENCY MEDICINE

## 2023-11-01 PROCEDURE — 93005 ELECTROCARDIOGRAM TRACING: CPT | Performed by: EMERGENCY MEDICINE

## 2023-11-01 PROCEDURE — 99283 EMERGENCY DEPT VISIT LOW MDM: CPT

## 2023-11-01 PROCEDURE — 80053 COMPREHEN METABOLIC PANEL: CPT | Performed by: EMERGENCY MEDICINE

## 2023-11-01 PROCEDURE — 81001 URINALYSIS AUTO W/SCOPE: CPT | Performed by: EMERGENCY MEDICINE

## 2023-11-01 RX ORDER — ONDANSETRON 4 MG/1
4 TABLET, ORALLY DISINTEGRATING ORAL EVERY 8 HOURS PRN
Qty: 15 TABLET | Refills: 0 | Status: SHIPPED | OUTPATIENT
Start: 2023-11-01

## 2023-11-01 RX ORDER — SODIUM CHLORIDE 0.9 % (FLUSH) 0.9 %
10 SYRINGE (ML) INJECTION AS NEEDED
Status: DISCONTINUED | OUTPATIENT
Start: 2023-11-01 | End: 2023-11-02 | Stop reason: HOSPADM

## 2023-11-01 RX ORDER — MECLIZINE HYDROCHLORIDE 25 MG/1
25 TABLET ORAL 3 TIMES DAILY PRN
Qty: 20 TABLET | Refills: 0 | Status: SHIPPED | OUTPATIENT
Start: 2023-11-01

## 2023-11-02 LAB
QT INTERVAL: 402 MS
QTC INTERVAL: 400 MS

## 2023-11-02 NOTE — ED PROVIDER NOTES
Time: 8:21 PM EDT  Date of encounter:  2023  Independent Historian/Clinical History and Information was obtained by:   Patient  Chief Complaint: Dizzy    History is limited by: N/A    History of Present Illness:  Patient is a 53 y.o. year old female who presents to the emergency department for evaluation of dizzy.  Patient reports that she has been feeling dizzy for the last 4 days and getting worse.  She states when she gets up fast or with head movement she gets dizzy.  Reports vertigo and tinnitus.  Reports that she had the symptoms in the past and was seen by the doctor who gave her meclezine, which helped. Patient reports that she is running out of her meclezine.  Patient reports no headache or vomiting.    HPI    Patient Care Team  Primary Care Provider: Provider, No Known    Past Medical History:     No Known Allergies  Past Medical History:   Diagnosis Date    Hypertension      Past Surgical History:   Procedure Laterality Date     SECTION       History reviewed. No pertinent family history.    Home Medications:  Prior to Admission medications    Medication Sig Start Date End Date Taking? Authorizing Provider   labetalol (NORMODYNE) 100 MG tablet Take 100 mg by mouth Daily.    Emergency, Nurse Seun, RN        Social History:   Social History     Tobacco Use    Smoking status: Never    Smokeless tobacco: Never   Vaping Use    Vaping Use: Never used   Substance Use Topics    Alcohol use: Yes     Comment: social         Review of Systems:  Review of Systems   Constitutional:  Negative for chills and fever.   HENT:  Positive for tinnitus. Negative for congestion, ear pain and sore throat.    Eyes:  Negative for pain.   Respiratory:  Negative for cough, chest tightness and shortness of breath.    Cardiovascular:  Negative for chest pain.   Gastrointestinal:  Negative for abdominal pain, diarrhea, nausea and vomiting.   Genitourinary:  Negative for flank pain and hematuria.   Musculoskeletal:   "Negative for joint swelling.   Skin:  Negative for pallor.   Neurological:  Positive for dizziness. Negative for seizures and headaches.   All other systems reviewed and are negative.         Physical Exam:  /75   Pulse 61   Temp 98.7 °F (37.1 °C) (Oral)   Resp 18   Ht 175.3 cm (69\")   Wt 85.2 kg (187 lb 13.3 oz)   SpO2 99%   BMI 27.74 kg/m²     Physical Exam  Vitals and nursing note reviewed.   Constitutional:       General: She is not in acute distress.     Appearance: Normal appearance. She is not toxic-appearing.   HENT:      Head: Normocephalic and atraumatic.      Mouth/Throat:      Mouth: Mucous membranes are moist.   Eyes:      General: No scleral icterus.     Extraocular Movements: Extraocular movements intact.      Right eye: Normal extraocular motion and no nystagmus.      Left eye: Normal extraocular motion and no nystagmus.      Conjunctiva/sclera: Conjunctivae normal.      Pupils: Pupils are equal, round, and reactive to light.   Cardiovascular:      Rate and Rhythm: Normal rate and regular rhythm.      Pulses: Normal pulses.      Heart sounds: Normal heart sounds.   Pulmonary:      Effort: Pulmonary effort is normal. No respiratory distress.      Breath sounds: Normal breath sounds.   Abdominal:      General: Abdomen is flat.      Palpations: Abdomen is soft.      Tenderness: There is no abdominal tenderness.   Musculoskeletal:         General: Normal range of motion.      Cervical back: Normal range of motion and neck supple.   Skin:     General: Skin is warm and dry.   Neurological:      General: No focal deficit present.      Mental Status: She is alert and oriented to person, place, and time. Mental status is at baseline.      Cranial Nerves: No cranial nerve deficit.      Sensory: No sensory deficit.      Gait: Gait normal.      Comments: Positive [facility]   Psychiatric:         Mood and Affect: Mood normal.         Judgment: Judgment normal.         Vital signs were reviewed " under triage note.            Procedures:  Procedures      Medical Decision Making:      Comorbidities that affect care:    Hypertension    External Notes reviewed:    Previous Clinic Note: Urgent care visit 2/25/2022 with same condition of dizziness.      The following orders were placed and all results were independently analyzed by me:  Orders Placed This Encounter   Procedures    [place] Draw    Comprehensive Metabolic Panel    Urinalysis With Microscopic If Indicated (No Culture) - Urine, Clean Catch    CBC Auto Differential    Urinalysis, Microscopic Only - Urine, Clean Catch    NPO Diet NPO Type: Strict NPO    Undress & Gown    Continuous Pulse Oximetry    Vital Signs    Orthostatic Blood Pressure    Orthostatic Vitals    Oxygen Therapy- Nasal Cannula; Titrate 1-6 LPM Per SpO2; 90 - 95%    POC Glucose Once    ECG 12 Lead ED Triage Standing Order; Weak / Dizzy / AMS    Insert Peripheral IV    Fall Precautions    CBC & Differential    Green Top (Gel)    Lavender Top    Gold Top - SST    Light Blue Top       Medications Given in the Emergency Department:  Medications   sodium chloride 0.9 % flush 10 mL (has no administration in time range)        ED Course:        Labs:    Lab Results (last 24 hours)       Procedure Component Value Units Date/Time    CBC & Differential [[id number]]  (Abnormal) Collected: 11/01/23 2038    Specimen: Blood Updated: 11/01/23 2103    Narrative:      The following orders were created for panel order CBC & Differential.  Procedure                               Abnormality         Status                     ---------                               -----------         ------                     CBC Auto Differential[[id number]]        Abnormal            Final result               Scan Slide[[id number]]                                                                    Please view results for these tests on the individual orders.    Comprehensive Metabolic Panel [[id number]]  (Abnormal)  Collected: 11/01/23 2038    Specimen: Blood Updated: 11/01/23 2121     Glucose 96 mg/dL      BUN 23 mg/dL      Creatinine 1.14 mg/dL      Sodium 141 mmol/L      Potassium 4.0 mmol/L      Chloride 104 mmol/L      CO2 26.6 mmol/L      Calcium 9.5 mg/dL      Total Protein 7.5 g/dL      Albumin 4.4 g/dL      ALT (SGPT) 15 U/L      AST (SGOT) 19 U/L      Alkaline Phosphatase 68 U/L      Total Bilirubin 0.5 mg/dL      Globulin 3.1 gm/dL      A/G Ratio 1.4 g/dL      BUN/Creatinine Ratio 20.2     Anion Gap 10.4 mmol/L      eGFR 58.0 mL/min/1.73     Narrative:      GFR Normal >60  Chronic Kidney Disease <60  Kidney Failure <15      CBC Auto Differential [[id number]]  (Abnormal) Collected: 11/01/23 2038    Specimen: Blood Updated: 11/01/23 2103     WBC 4.88 10*3/mm3      RBC 5.03 10*6/mm3      Hemoglobin 12.7 g/dL      Hematocrit 40.0 %      MCV 79.5 fL      MCH 25.2 pg      MCHC 31.8 g/dL      RDW 13.2 %      RDW-SD 37.8 fl      MPV 12.7 fL      Platelets 208 10*3/mm3      Neutrophil % 44.9 %      Lymphocyte % 45.7 %      Monocyte % 6.4 %      Eosinophil % 1.2 %      Basophil % 1.6 %      Immature Grans % 0.2 %      Neutrophils, Absolute 2.19 10*3/mm3      Lymphocytes, Absolute 2.23 10*3/mm3      Monocytes, Absolute 0.31 10*3/mm3      Eosinophils, Absolute 0.06 10*3/mm3      Basophils, Absolute 0.08 10*3/mm3      Immature Grans, Absolute 0.01 10*3/mm3      nRBC 0.0 /100 WBC     Urinalysis With Microscopic If Indicated (No Culture) - Urine, Clean Catch [[id number]]  (Abnormal) Collected: 11/01/23 2117    Specimen: Urine, Clean Catch Updated: 11/01/23 2140     Color, UA Yellow     Appearance, UA Clear     pH, UA 5.5     Specific Gravity, UA 1.014     Glucose, UA Negative     Ketones, UA Negative     Bilirubin, UA Negative     Blood, UA Negative     Protein, UA Negative     Leuk Esterase, UA Trace     Nitrite, UA Negative     Urobilinogen, UA 0.2 E.U./dL    Urinalysis, Microscopic Only - Urine, Clean Catch [[id number]] Collected:  11/01/23 2117    Specimen: Urine, Clean Catch Updated: 11/01/23 2140     RBC, UA 0-2 /HPF      WBC, UA 0-2 /HPF      Bacteria, UA None Seen /HPF      Squamous Epithelial Cells, UA 0-2 /HPF      Hyaline Casts, UA None Seen /LPF      Methodology Automated Microscopy             Imaging:    No Radiology Exams Resulted Within Past 24 Hours      Differential Diagnosis and Discussion:    Dizziness: Based on the patient's history, signs, and symptoms, the diffential diagnosis includes but is not limited to meningitis, stroke, sepsis, subarachnoid hemorrhage, intracranial bleeding, encephalitis, vertigo, electrolyte imbalance, and metabolic disorders.    All labs were reviewed and interpreted by me.    MDM     Amount and/or Complexity of Data Reviewed  Clinical lab tests: reviewed  Tests in the medicine section of CPT®: reviewed    Risk of Complications, Morbidity, and/or Mortality  Presenting problems: moderate  Diagnostic procedures: moderate  Management options: moderate        Patient Care Considerations:    CONSULT: I considered consulting neurology, however patient is not in emergent need and can follow outpatient.      Consultants/Shared Management Plan:    None    Social Determinants of Health:    Patient is independent, reliable, and has access to care.       Disposition and Care Coordination:    Discharged: The patient is suitable and stable for discharge with no need for consideration of observation or admission.    [unfilled]  I have explained discharge medications and the need for follow up with the patient/caretakers. This was also printed in the discharge instructions. Patient was discharged with the following medications and follow up:      Medication List        New Prescriptions      meclizine 25 MG tablet  Commonly known as: ANTIVERT  Take 1 tablet by mouth 3 (Three) Times a Day As Needed for Dizziness.     ondansetron ODT 4 MG disintegrating tablet  Commonly known as: ZOFRAN-ODT  Place 1 tablet on  the tongue Every 8 (Eight) Hours As Needed for Nausea or Vomiting.               Where to Get Your Medications        These medications were sent to [facility]/pharmacy #[id number] - [place], [place] - [address] - [phone] [facility] [phone] FX  [address] [place] [address]      Hours: 24-hours Phone: [phone]   meclizine 25 MG tablet  ondansetron ODT 4 MG disintegrating tablet      Provider, No Known  [facility] [id number] [phone]    Schedule an appointment as soon as possible for a visit   If symptoms worsen       Final diagnoses:   Benign paroxysmal positional vertigo, unspecified laterality        ED Disposition       ED Disposition   Discharge    Condition   Stable    Comment   --               This medical record created using voice recognition software.             Leana Jacobson, MAGALI  11/01/23 2307       Leana Jacobson, APRLUCIEN  11/01/23 9258

## 2023-11-02 NOTE — DISCHARGE INSTRUCTIONS
Take all medications as prescribed. Read and follow educational instructions provided to you in discharge packet. If symptoms worsen or fail to improve as anticipated return to ER.  Patient agrees to treatment plan.

## 2023-11-22 VITALS
RESPIRATION RATE: 12 BRPM | DIASTOLIC BLOOD PRESSURE: 72 MMHG | DIASTOLIC BLOOD PRESSURE: 70 MMHG | DIASTOLIC BLOOD PRESSURE: 76 MMHG | HEART RATE: 80 BPM | HEART RATE: 77 BPM | DIASTOLIC BLOOD PRESSURE: 86 MMHG | HEART RATE: 61 BPM | HEART RATE: 54 BPM | HEIGHT: 66 IN | SYSTOLIC BLOOD PRESSURE: 141 MMHG | HEART RATE: 79 BPM | OXYGEN SATURATION: 98 % | HEART RATE: 71 BPM | TEMPERATURE: 97 F | HEART RATE: 75 BPM | SYSTOLIC BLOOD PRESSURE: 154 MMHG | RESPIRATION RATE: 13 BRPM | DIASTOLIC BLOOD PRESSURE: 75 MMHG | RESPIRATION RATE: 17 BRPM | DIASTOLIC BLOOD PRESSURE: 51 MMHG | RESPIRATION RATE: 14 BRPM | DIASTOLIC BLOOD PRESSURE: 80 MMHG | SYSTOLIC BLOOD PRESSURE: 119 MMHG | SYSTOLIC BLOOD PRESSURE: 131 MMHG | TEMPERATURE: 96.8 F | OXYGEN SATURATION: 99 % | SYSTOLIC BLOOD PRESSURE: 126 MMHG | SYSTOLIC BLOOD PRESSURE: 123 MMHG | DIASTOLIC BLOOD PRESSURE: 89 MMHG | WEIGHT: 175 LBS | RESPIRATION RATE: 23 BRPM | HEART RATE: 82 BPM | SYSTOLIC BLOOD PRESSURE: 121 MMHG | RESPIRATION RATE: 16 BRPM | HEART RATE: 67 BPM | RESPIRATION RATE: 22 BRPM | RESPIRATION RATE: 9 BRPM | SYSTOLIC BLOOD PRESSURE: 125 MMHG | OXYGEN SATURATION: 100 % | RESPIRATION RATE: 18 BRPM | SYSTOLIC BLOOD PRESSURE: 160 MMHG

## 2023-11-28 ENCOUNTER — OFFICE (AMBULATORY)
Dept: URBAN - METROPOLITAN AREA PATHOLOGY 4 | Facility: PATHOLOGY | Age: 53
End: 2023-11-28

## 2023-11-28 ENCOUNTER — AMBULATORY SURGICAL CENTER (AMBULATORY)
Dept: URBAN - METROPOLITAN AREA SURGERY 17 | Facility: SURGERY | Age: 53
End: 2023-11-28

## 2023-11-28 DIAGNOSIS — D12.2 BENIGN NEOPLASM OF ASCENDING COLON: ICD-10-CM

## 2023-11-28 DIAGNOSIS — Z12.11 ENCOUNTER FOR SCREENING FOR MALIGNANT NEOPLASM OF COLON: ICD-10-CM

## 2023-11-28 PROBLEM — K63.5 POLYP OF COLON: Status: ACTIVE | Noted: 2023-11-28

## 2023-11-28 LAB
GI HISTOLOGY: A. UNSPECIFIED: (no result)
GI HISTOLOGY: PDF REPORT: (no result)

## 2023-11-28 PROCEDURE — 45380 COLONOSCOPY AND BIOPSY: CPT | Mod: 33 | Performed by: INTERNAL MEDICINE

## 2023-11-28 PROCEDURE — 88305 TISSUE EXAM BY PATHOLOGIST: CPT | Performed by: INTERNAL MEDICINE

## 2024-06-14 ENCOUNTER — OFFICE VISIT (OUTPATIENT)
Dept: NEUROLOGY | Facility: CLINIC | Age: 54
End: 2024-06-14
Payer: OTHER GOVERNMENT

## 2024-06-14 VITALS
WEIGHT: 183 LBS | BODY MASS INDEX: 29.41 KG/M2 | SYSTOLIC BLOOD PRESSURE: 130 MMHG | HEART RATE: 60 BPM | HEIGHT: 66 IN | DIASTOLIC BLOOD PRESSURE: 74 MMHG | OXYGEN SATURATION: 99 %

## 2024-06-14 DIAGNOSIS — G43.709 CHRONIC MIGRAINE WITHOUT AURA WITHOUT STATUS MIGRAINOSUS, NOT INTRACTABLE: Primary | ICD-10-CM

## 2024-06-14 PROBLEM — R00.2 PALPITATIONS: Status: ACTIVE | Noted: 2019-05-04

## 2024-06-14 PROBLEM — G25.81 RESTLESS LEGS: Status: ACTIVE | Noted: 2024-02-15

## 2024-06-14 PROBLEM — M54.9 BACKACHE: Status: ACTIVE | Noted: 2024-02-15

## 2024-06-14 PROBLEM — I10 HYPERTENSIVE DISORDER: Status: ACTIVE | Noted: 2024-02-15

## 2024-06-14 PROBLEM — F41.9 ANXIETY: Status: ACTIVE | Noted: 2024-02-15

## 2024-06-14 RX ORDER — TOPIRAMATE 50 MG/1
50 TABLET, FILM COATED ORAL 2 TIMES DAILY
Qty: 60 TABLET | Refills: 2 | Status: SHIPPED | OUTPATIENT
Start: 2024-06-14

## 2024-06-14 RX ORDER — TOPIRAMATE 25 MG/1
TABLET ORAL
Qty: 70 TABLET | Refills: 0 | Status: SHIPPED | OUTPATIENT
Start: 2024-06-14

## 2024-06-14 RX ORDER — NAPROXEN 500 MG/1
TABLET ORAL
COMMUNITY
Start: 2024-05-21

## 2024-06-14 NOTE — PROGRESS NOTES
[facility] NEUROLOGY         Date of Visit: 2024    Name: Armida Don    :  1970    PCP: Savage Pugh PA    Visit Type: an initial evaluation         Subjective     Patient ID: Armida is a 54 y.o. female.         History of Present Illness  I have had the pleasure of seeing your patient today. As you may know she is a 54 year old female here today to establish care for treatment of migraine headache. She was referred by her PCP.    History:    Patient does have history of hearing loss, chronic vertigo, lumbar spinal stenosis, chronic neck pain, and migraine headache.    Patient states that she began experiancing symptoms of migraine headaches with worsening severity about 3 years ago. At that time she had recently transferred to [place] on assignment with the . She states that previously headaches had been mild and well controlled and aborted with OTC therapy. However frequency and severity was increasing so PCP here attemepted several medications to help with management.    Patient has now tried two abortive therapies for migraine headache including rizatriptian which was not effective and more recently sumatriptian which dulls the headache but does not abort it and causes drowsiness. Patient was also now been on topiramate for 2 years at 25 mg once daily with no side effects or headache improvement. She takes gabapentin daily already for back pain.    Patient denies family history of migraine. She does have family history of stroke with her father experiancing stroke in his 40's. She has never had any previous imaging of the brain.    Current:    Patient states that currently she is experiencing 3-4 headache days per week with headaches lasting 12-72 hours in duration. Headaches start in the frontal temporal area and are usually unilateral however can be right or left sided. Patient does experience double vision, facial tingling, facial droop, and blurry vision  with the headaches as well as photophobia, phonophobia, nausea, and occasionally vomitting. She denies any known triggers for headaches. She denies any other new neurological complaints at today's appointment.       The following portions of the patient's history were reviewed and updated as appropriate: allergies, current medications, past family history, past medical history, past social history, past surgical history, and problem list.                 Review of Systems   Constitutional:  Negative for activity change, appetite change, fatigue and unexpected weight change.   HENT:  Positive for hearing loss and tinnitus. Negative for trouble swallowing.    Eyes:  Positive for photophobia and visual disturbance. Negative for pain.   Respiratory:  Negative for chest tightness and shortness of breath.    Cardiovascular:  Negative for palpitations.   Gastrointestinal:  Negative for nausea and vomiting.   Musculoskeletal:  Negative for neck pain.   Neurological:  Positive for dizziness, facial asymmetry, numbness and headaches. Negative for syncope, speech difficulty, weakness and light-headedness.   Psychiatric/Behavioral:  Negative for confusion and sleep disturbance.             Current Medications:    Current Outpatient Medications   Medication Instructions    Addyi 100 MG tablet     baclofen (LIORESAL) 10 MG tablet     busPIRone (BUSPAR) 10 MG tablet     cetirizine (zyrTEC) 10 MG tablet     chlorthalidone (HYGROTON) 25 MG tablet     fluticasone (FLONASE) 50 MCG/ACT nasal spray 2 sprays, Nasal, Daily, 2 puffs each nostril    gabapentin (NEURONTIN) 300 MG capsule     ibuprofen (ADVIL,MOTRIN) 600 mg, Oral, Every 8 Hours PRN    labetalol (NORMODYNE) 100 mg, Daily    Lidoderm 5 %     lisinopril (PRINIVIL,ZESTRIL) 10 MG tablet     losartan (COZAAR) 50 MG tablet     meclizine (ANTIVERT) 25 mg, Oral, 3 Times Daily PRN    melatonin 3 MG tablet     naproxen (NAPROSYN) 500 MG tablet     Narcan 4 MG/0.1ML nasal spray      "olopatadine (PATANOL) 0.1 % ophthalmic solution     oxybutynin XL (DITROPAN-XL) 5 MG 24 hr tablet     polyethylene glycol (MIRALAX) 17 GM/SCOOP powder     rOPINIRole (REQUIP) 1 MG tablet     sertraline (ZOLOFT) 100 MG tablet     topiramate (TOPAMAX) 25 MG tablet Take 1 tablet by mouth once daily x7 days, then take 1 tablet twice daily x7 days, then take 1 tablet in the morning and 2 tablets at night x7 days, then take 2 tablets twice daily x7 days    topiramate (TOPAMAX) 50 mg, Oral, 2 Times Daily    traMADol (ULTRAM) 50 MG tablet     ubrogepant (UBRELVY) 100 mg, Oral, As Needed          /74   Pulse 60   Ht 167.6 cm (66\")   Wt 83 kg (183 lb)   SpO2 99%   BMI 29.54 kg/m²                Objective     Neurological Exam  Mental Status  Awake, alert and oriented to person, place and time. Speech is normal. Language is fluent with no aphasia.    Cranial Nerves  CN II: Visual fields full to confrontation.  CN III, IV, VI: Extraocular movements intact bilaterally. Normal lids and orbits bilaterally. Pupils equal round and reactive to light bilaterally.  CN V: Facial sensation is normal.  CN VII: Full and symmetric facial movement.  CN IX, X: Palate elevates symmetrically  CN XI: Shoulder shrug strength is normal.  CN XII: Tongue midline without atrophy or fasciculations.    Motor  Normal muscle bulk throughout. Normal muscle tone. No abnormal involuntary movements. Strength is 5/5 throughout all four extremities.    Sensory  Sensation is intact to light touch, pinprick, vibration and proprioception in all four extremities.    Reflexes  Deep tendon reflexes are 2+ and symmetric in all four extremities.    Coordination    Finger-to-nose, rapid alternating movements and heel-to-shin normal bilaterally without dysmetria.    Gait  Normal casual, toe, heel and tandem gait.      Physical Exam  Constitutional:       Appearance: Normal appearance. She is normal weight.   Eyes:      General: Lids are normal.      " Extraocular Movements: Extraocular movements intact.      Pupils: Pupils are equal, round, and reactive to light.   Pulmonary:      Effort: Pulmonary effort is normal.   Skin:     General: Skin is warm.   Neurological:      Motor: Motor strength is normal.     Coordination: Coordination is intact.      Deep Tendon Reflexes: Reflexes are normal and symmetric.   Psychiatric:         Mood and Affect: Mood normal.         Speech: Speech normal.         Behavior: Behavior normal.                     Assessment & Plan     Diagnoses and all orders for this visit:    1. Chronic migraine without aura without status migrainosus, not intractable (Primary)  -     ubrogepant (Ubrelvy) 100 MG tablet; Take 1 tablet by mouth As Needed (onset of migraine. may repeat dose in 2 hours x 1. Max of 2 doses in 24 hours.) for up to 180 days.  Dispense: 10 tablet; Refill: 5  -     topiramate (TOPAMAX) 25 MG tablet; Take 1 tablet by mouth once daily x7 days, then take 1 tablet twice daily x7 days, then take 1 tablet in the morning and 2 tablets at night x7 days, then take 2 tablets twice daily x7 days  Dispense: 70 tablet; Refill: 0  -     topiramate (Topamax) 50 MG tablet; Take 1 tablet by mouth 2 (Two) Times a Day.  Dispense: 60 tablet; Refill: 2  -     MRI Brain With & Without Contrast; Future          At this time we will plan to try increasing patient's topirimate as she is currently at subtheraputic doseage for headache prevention.    We will also have patient try ubrevly for abortive treatment.     I would also like to obtain MRI brain to rule out any other abnormalities that would contribute to increased headache frequency and severity.    Follow up in 6 weeks or sooner if needed.          Lyn DE LOS SANTOS    Neurology    [facility] Neurology [place]    Phone: [phone]    6/14/2024 , 17:03 EDT

## 2024-06-21 ENCOUNTER — TELEPHONE (OUTPATIENT)
Dept: NEUROLOGY | Facility: CLINIC | Age: 54
End: 2024-06-21
Payer: OTHER GOVERNMENT

## 2024-06-21 NOTE — TELEPHONE ENCOUNTER
Provider: LISETH KILGORE    Caller: PATIENT    Relationship to Patient: SELF    Pharmacy: [facility]    Phone Number: [phone]    Reason for Call: PATIENT STATES THE UBRELVY IS NEEDING A PRIOR AUTH. PATIENT WOULD LIKE A CALL WITH AN UPDATE SO SHE KNOWS WHEN TO GO GET SCRIPT. PLEASE ADVISE, THANK YOU.

## 2024-06-24 NOTE — TELEPHONE ENCOUNTER
Caller: Armida Don    Relationship: Self    Best call back number:   Telephone Information:   Mobile [phone]         Who are you requesting to speak with (clinical staff, provider,  specific staff member): CLINICAL    What was the call regarding:  [employer] ADVISED HER TO HAVE CLINIC CALL [phone] TO START PRIOR AUTH.

## 2024-06-24 NOTE — TELEPHONE ENCOUNTER
Prior auth started on cover my meds       CaseId:[id number];Status:Approved;Review Type:Prior Auth;Coverage Start Date:05/25/2024;Coverage End Date:12/21/2024;

## 2024-07-05 ENCOUNTER — HOSPITAL ENCOUNTER (OUTPATIENT)
Dept: MRI IMAGING | Facility: HOSPITAL | Age: 54
Discharge: HOME | End: 2024-07-05
Admitting: NURSE PRACTITIONER
Payer: OTHER GOVERNMENT

## 2024-07-05 DIAGNOSIS — G43.709 CHRONIC MIGRAINE WITHOUT AURA WITHOUT STATUS MIGRAINOSUS, NOT INTRACTABLE: ICD-10-CM

## 2024-07-05 PROCEDURE — 70553 MRI BRAIN STEM W/O & W/DYE: CPT

## 2024-07-05 PROCEDURE — 0 GADOBENATE DIMEGLUMINE 529 MG/ML SOLUTION: Performed by: NURSE PRACTITIONER

## 2024-07-05 PROCEDURE — A9577 INJ MULTIHANCE: HCPCS | Performed by: NURSE PRACTITIONER

## 2024-07-05 RX ADMIN — GADOBENATE DIMEGLUMINE 15 ML: 529 INJECTION, SOLUTION INTRAVENOUS at 13:35

## 2024-07-12 ENCOUNTER — OFFICE VISIT (OUTPATIENT)
Dept: NEUROLOGY | Facility: CLINIC | Age: 54
End: 2024-07-12
Payer: OTHER GOVERNMENT

## 2024-07-12 VITALS
HEART RATE: 63 BPM | WEIGHT: 183 LBS | DIASTOLIC BLOOD PRESSURE: 70 MMHG | OXYGEN SATURATION: 97 % | SYSTOLIC BLOOD PRESSURE: 122 MMHG | HEIGHT: 66 IN | BODY MASS INDEX: 29.41 KG/M2

## 2024-07-12 DIAGNOSIS — G43.709 CHRONIC MIGRAINE WITHOUT AURA WITHOUT STATUS MIGRAINOSUS, NOT INTRACTABLE: ICD-10-CM

## 2024-07-12 PROBLEM — G47.30 SLEEP APNEA: Status: ACTIVE | Noted: 2024-07-12

## 2024-07-12 RX ORDER — SUMATRIPTAN 50 MG/1
TABLET, FILM COATED ORAL
COMMUNITY
Start: 2024-06-17 | End: 2024-07-12

## 2024-07-12 RX ORDER — BUSPIRONE HYDROCHLORIDE 30 MG/1
30 TABLET ORAL 2 TIMES DAILY
COMMUNITY

## 2024-07-12 RX ORDER — BUSPIRONE HYDROCHLORIDE 15 MG/1
TABLET ORAL
COMMUNITY
Start: 2024-07-08 | End: 2024-07-12

## 2024-07-12 RX ORDER — TOPIRAMATE 50 MG/1
100 TABLET, FILM COATED ORAL 2 TIMES DAILY
Qty: 360 TABLET | Refills: 1 | Status: SHIPPED | OUTPATIENT
Start: 2024-07-12 | End: 2025-01-08

## 2024-07-12 NOTE — PROGRESS NOTES
[facility] NEUROLOGY         Date of Visit: 2024    Name: Armida Don    :  1970    PCP: Savage Pugh PA    Visit Type: an initial evaluation         Subjective     Patient ID: Armida is a 54 y.o. female.         History of Present Illness  I have had the pleasure of seeing your patient today. As you may know she is a 54 year old female here today to establish care for treatment of migraine headache. She was referred by her PCP.    History:    Patient does have history of hearing loss, chronic vertigo, lumbar spinal stenosis, chronic neck pain, and migraine headache.    Patient states that she began experiancing symptoms of migraine headaches with worsening severity about 3 years ago. At that time she had recently transferred to [place] on assignment with the . She states that previously headaches had been mild and well controlled and aborted with OTC therapy. However frequency and severity was increasing so PCP here attemepted several medications to help with management.    Patient has now tried two abortive therapies for migraine headache including rizatriptian which was not effective and more recently sumatriptian which dulls the headache but does not abort it and causes drowsiness. Patient was also now been on topiramate for 2 years at 25 mg once daily with no side effects or headache improvement. She takes gabapentin daily already for back pain.    Patient denies family history of migraine. She does have family history of stroke with her father experiancing stroke in his 40's. She has never had any previous imaging of the brain.    Current:    At last appointment we did end up increasing patient's topiramate slowly up to 50 mg twice daily.  She tolerated the dose increase without side effect complaints.  She states that she has noticed improvement in her headache symptoms.  Headaches are less severe and lasts only a couple of hours in duration versus 12 to 24  hours in duration.  She states that the Ubrelvy is very helpful for aborting the headache typically.  She is still experiencing approximately 3 or so headache days per week.  She again denies any side effects to the medication.  She denies any new symptoms with the headaches.  No other new neurological complaints at today's visit.      The following portions of the patient's history were reviewed and updated as appropriate: allergies, current medications, past family history, past medical history, past social history, past surgical history, and problem list.                 Review of Systems   Constitutional:  Negative for activity change, appetite change, fatigue and unexpected weight change.   HENT:  Positive for hearing loss and tinnitus. Negative for trouble swallowing.    Eyes:  Positive for photophobia and visual disturbance. Negative for pain.   Respiratory:  Negative for chest tightness and shortness of breath.    Cardiovascular:  Negative for palpitations.   Gastrointestinal:  Negative for nausea and vomiting.   Musculoskeletal:  Negative for neck pain.   Neurological:  Positive for dizziness, facial asymmetry, numbness and headaches. Negative for syncope, speech difficulty, weakness and light-headedness.   Psychiatric/Behavioral:  Negative for confusion and sleep disturbance.             Current Medications:    Current Outpatient Medications   Medication Instructions    Addyi 100 MG tablet     baclofen (LIORESAL) 10 MG tablet     busPIRone (BUSPAR) 30 mg, Oral, 2 Times Daily    cetirizine (zyrTEC) 10 MG tablet     fluticasone (FLONASE) 50 MCG/ACT nasal spray 2 sprays, Nasal, Daily, 2 puffs each nostril    gabapentin (NEURONTIN) 300 MG capsule     ibuprofen (ADVIL,MOTRIN) 600 mg, Oral, Every 8 Hours PRN    Lidoderm 5 %     losartan (COZAAR) 50 MG tablet     meclizine (ANTIVERT) 25 mg, Oral, 3 Times Daily PRN    melatonin 3 MG tablet     naproxen (NAPROSYN) 500 MG tablet     Narcan 4 MG/0.1ML nasal spray      "olopatadine (PATANOL) 0.1 % ophthalmic solution     oxybutynin XL (DITROPAN-XL) 5 MG 24 hr tablet     polyethylene glycol (MIRALAX) 17 GM/SCOOP powder     rOPINIRole (REQUIP) 1 MG tablet     sertraline (ZOLOFT) 100 MG tablet     topiramate (TOPAMAX) 100 mg, Oral, 2 Times Daily    traMADol (ULTRAM) 50 MG tablet     ubrogepant (UBRELVY) 100 mg, Oral, As Needed          /70   Pulse 63   Ht 167.6 cm (65.98\")   Wt 83 kg (183 lb)   SpO2 97%   BMI 29.55 kg/m²                Objective     Neurological Exam  Mental Status  Awake, alert and oriented to person, place and time. Speech is normal. Language is fluent with no aphasia.    Cranial Nerves  CN II: Visual fields full to confrontation.  CN III, IV, VI: Extraocular movements intact bilaterally. Normal lids and orbits bilaterally. Pupils equal round and reactive to light bilaterally.  CN V: Facial sensation is normal.  CN VII: Full and symmetric facial movement.  CN IX, X: Palate elevates symmetrically  CN XI: Shoulder shrug strength is normal.  CN XII: Tongue midline without atrophy or fasciculations.    Motor  Normal muscle bulk throughout. Normal muscle tone. No abnormal involuntary movements. Strength is 5/5 throughout all four extremities.    Sensory  Sensation is intact to light touch, pinprick, vibration and proprioception in all four extremities.    Reflexes  Deep tendon reflexes are 2+ and symmetric in all four extremities.    Coordination    Finger-to-nose, rapid alternating movements and heel-to-shin normal bilaterally without dysmetria.    Gait  Normal casual, toe, heel and tandem gait.      Physical Exam  Constitutional:       Appearance: Normal appearance. She is normal weight.   Eyes:      General: Lids are normal.      Extraocular Movements: Extraocular movements intact.      Pupils: Pupils are equal, round, and reactive to light.   Pulmonary:      Effort: Pulmonary effort is normal.   Skin:     General: Skin is warm.   Neurological:      Motor: " Motor strength is normal.     Coordination: Coordination is intact.      Deep Tendon Reflexes: Reflexes are normal and symmetric.   Psychiatric:         Mood and Affect: Mood normal.         Speech: Speech normal.         Behavior: Behavior normal.                     Assessment & Plan     Diagnoses and all orders for this visit:    1. Chronic migraine without aura without status migrainosus, not intractable  -     topiramate (Topamax) 50 MG tablet; Take 2 tablets by mouth 2 (Two) Times a Day for 180 days.  Dispense: 360 tablet; Refill: 1  -     ubrogepant (Ubrelvy) 100 MG tablet; Take 1 tablet by mouth As Needed (onset of migraine. may repeat dose in 2 hours x 1. Max of 2 doses in 24 hours.) for up to 180 days.  Dispense: 10 tablet; Refill: 5          At this time we will increase patient up to 100 mg twice daily on the topiramate.    We will continue Ubrelvy for abortive treatment.    Follow-up in 3 months or sooner if needed.       Lyn DE LOS SANTOS    Neurology    [facility] Neurology [place]    Phone: [phone]    7/12/2024 , 11:23 EDT

## 2024-10-03 ENCOUNTER — TELEPHONE (OUTPATIENT)
Dept: NEUROLOGY | Facility: CLINIC | Age: 54
End: 2024-10-03
Payer: OTHER GOVERNMENT

## 2024-10-03 NOTE — TELEPHONE ENCOUNTER
Spoke to pt on 10/10 confirming appt, pt needed to cancel appt due to being out of state at the moment. She advised she would call back to reschedule when she is back in the state of [place]. Confirmed with pt I would be cancelling appt on 10/10.

## 2024-12-09 ENCOUNTER — PRIOR AUTHORIZATION (OUTPATIENT)
Dept: NEUROLOGY | Facility: CLINIC | Age: 54
End: 2024-12-09
Payer: OTHER GOVERNMENT

## 2024-12-16 NOTE — TELEPHONE ENCOUNTER
Received another PA request for patients Ubrelvy. Patient needs to be seen in office for us to start PA.